# Patient Record
Sex: MALE | Race: WHITE | Employment: OTHER | URBAN - METROPOLITAN AREA
[De-identification: names, ages, dates, MRNs, and addresses within clinical notes are randomized per-mention and may not be internally consistent; named-entity substitution may affect disease eponyms.]

---

## 2017-12-26 ENCOUNTER — TRANSFERRED RECORDS (OUTPATIENT)
Dept: HEALTH INFORMATION MANAGEMENT | Facility: CLINIC | Age: 82
End: 2017-12-26

## 2017-12-26 LAB — EJECTION FRACTION: 58

## 2018-06-28 LAB
CREAT SERPL-MCNC: 0.85 MG/DL (ref 0.67–1.17)
GFR SERPL CREATININE-BSD FRML MDRD: >60 ML/MIN/1.73M2 (ref 60–150)
GLUCOSE SERPL-MCNC: 128 MG/DL (ref 74–100)
POTASSIUM SERPL-SCNC: 3.8 MMOL/L (ref 3.5–5.1)

## 2018-07-02 ENCOUNTER — TRANSFERRED RECORDS (OUTPATIENT)
Dept: HEALTH INFORMATION MANAGEMENT | Facility: CLINIC | Age: 83
End: 2018-07-02

## 2018-07-09 ENCOUNTER — ANESTHESIA (OUTPATIENT)
Dept: GASTROENTEROLOGY | Facility: CLINIC | Age: 83
End: 2018-07-09
Payer: MEDICARE

## 2018-07-09 ENCOUNTER — ANESTHESIA EVENT (OUTPATIENT)
Dept: GASTROENTEROLOGY | Facility: CLINIC | Age: 83
End: 2018-07-09
Payer: MEDICARE

## 2018-07-09 ENCOUNTER — HOSPITAL ENCOUNTER (OUTPATIENT)
Facility: CLINIC | Age: 83
Discharge: HOME | End: 2018-07-09
Attending: INTERNAL MEDICINE | Admitting: INTERNAL MEDICINE
Payer: MEDICARE

## 2018-07-09 ENCOUNTER — SURGERY (OUTPATIENT)
Age: 83
End: 2018-07-09

## 2018-07-09 VITALS
HEIGHT: 72 IN | RESPIRATION RATE: 29 BRPM | BODY MASS INDEX: 25.06 KG/M2 | DIASTOLIC BLOOD PRESSURE: 72 MMHG | SYSTOLIC BLOOD PRESSURE: 122 MMHG | OXYGEN SATURATION: 94 % | WEIGHT: 185 LBS

## 2018-07-09 LAB — COLONOSCOPY: NORMAL

## 2018-07-09 PROCEDURE — 45378 DIAGNOSTIC COLONOSCOPY: CPT | Performed by: INTERNAL MEDICINE

## 2018-07-09 PROCEDURE — 25000125 ZZHC RX 250: Performed by: NURSE ANESTHETIST, CERTIFIED REGISTERED

## 2018-07-09 PROCEDURE — 37000008 ZZH ANESTHESIA TECHNICAL FEE, 1ST 30 MIN: Performed by: INTERNAL MEDICINE

## 2018-07-09 PROCEDURE — 25000128 H RX IP 250 OP 636: Performed by: NURSE ANESTHETIST, CERTIFIED REGISTERED

## 2018-07-09 PROCEDURE — G0105 COLORECTAL SCRN; HI RISK IND: HCPCS | Performed by: INTERNAL MEDICINE

## 2018-07-09 PROCEDURE — 40000010 ZZH STATISTIC ANES STAT CODE-CRNA PER MINUTE: Performed by: INTERNAL MEDICINE

## 2018-07-09 RX ORDER — SODIUM CHLORIDE, SODIUM LACTATE, POTASSIUM CHLORIDE, CALCIUM CHLORIDE 600; 310; 30; 20 MG/100ML; MG/100ML; MG/100ML; MG/100ML
INJECTION, SOLUTION INTRAVENOUS CONTINUOUS PRN
Status: DISCONTINUED | OUTPATIENT
Start: 2018-07-09 | End: 2018-07-09

## 2018-07-09 RX ORDER — LIDOCAINE 40 MG/G
CREAM TOPICAL
Status: DISCONTINUED | OUTPATIENT
Start: 2018-07-09 | End: 2018-07-09 | Stop reason: HOSPADM

## 2018-07-09 RX ORDER — PROPOFOL 10 MG/ML
INJECTION, EMULSION INTRAVENOUS CONTINUOUS PRN
Status: DISCONTINUED | OUTPATIENT
Start: 2018-07-09 | End: 2018-07-09

## 2018-07-09 RX ORDER — PROPOFOL 10 MG/ML
INJECTION, EMULSION INTRAVENOUS PRN
Status: DISCONTINUED | OUTPATIENT
Start: 2018-07-09 | End: 2018-07-09

## 2018-07-09 RX ORDER — ONDANSETRON 2 MG/ML
4 INJECTION INTRAMUSCULAR; INTRAVENOUS
Status: DISCONTINUED | OUTPATIENT
Start: 2018-07-09 | End: 2018-07-09 | Stop reason: HOSPADM

## 2018-07-09 RX ADMIN — DEXMEDETOMIDINE HYDROCHLORIDE 4 MCG: 100 INJECTION, SOLUTION INTRAVENOUS at 12:31

## 2018-07-09 RX ADMIN — DEXMEDETOMIDINE HYDROCHLORIDE 8 MCG: 100 INJECTION, SOLUTION INTRAVENOUS at 12:27

## 2018-07-09 RX ADMIN — PROPOFOL 40 MG: 10 INJECTION, EMULSION INTRAVENOUS at 12:29

## 2018-07-09 RX ADMIN — DEXMEDETOMIDINE HYDROCHLORIDE 8 MCG: 100 INJECTION, SOLUTION INTRAVENOUS at 12:29

## 2018-07-09 RX ADMIN — PROPOFOL 150 MCG/KG/MIN: 10 INJECTION, EMULSION INTRAVENOUS at 12:28

## 2018-07-09 RX ADMIN — SODIUM CHLORIDE, POTASSIUM CHLORIDE, SODIUM LACTATE AND CALCIUM CHLORIDE: 600; 310; 30; 20 INJECTION, SOLUTION INTRAVENOUS at 12:27

## 2018-07-09 ASSESSMENT — LIFESTYLE VARIABLES: TOBACCO_USE: 1

## 2018-07-09 ASSESSMENT — ENCOUNTER SYMPTOMS
DYSRHYTHMIAS: 0
SEIZURES: 0

## 2018-07-09 NOTE — ANESTHESIA CARE TRANSFER NOTE
Patient: Ye Johns    Procedure(s):  COLONOSCOPY (MAC)  - Wound Class: II-Clean Contaminated    Diagnosis: HISTORY OF COLON POLYPS/RECALL COLONOSCOPY  Diagnosis Additional Information: No value filed.    Anesthesia Type:   MAC     Note:  Airway :Nasal Cannula  Patient transferred to:PACU  Comments: Pt to endo recovery. VSS. Report complete to RN.Handoff Report: Identifed the Patient, Identified the Reponsible Provider, Reviewed the pertinent medical history, Discussed the surgical course, Reviewed Intra-OP anesthesia mangement and issues during anesthesia, Set expectations for post-procedure period and Allowed opportunity for questions and acknowledgement of understanding      Vitals: (Last set prior to Anesthesia Care Transfer)    CRNA VITALS  7/9/2018 1220 - 7/9/2018 1254      7/9/2018             Pulse: 60    SpO2: 94 %    Resp Rate (set): 10                Electronically Signed By: Lacie Roman CRNA, APRN CRNA  July 9, 2018  12:54 PM

## 2018-07-09 NOTE — CONSULTS
Pre-Endoscopy History and Physical     Ye Johns MRN# [id number]   YOB: 1935 Age: 83 year old     Date of Procedure: 7/9/2018  Primary care provider: José Sands  Type of Endoscopy: colonoscopy  Reason for Procedure: previous polyps  Type of Anesthesia Anticipated: MAC    HPI:    Ye is a 83 year old male who will be undergoing the above procedure.      A history and physical has been performed. The patient's medications and allergies have been reviewed. The risks and benefits of the procedure and the sedation options and risks were discussed with the patient.  All questions were answered and informed consent was obtained.      No Known Allergies     No current facility-administered medications for this encounter.        There is no problem list on file for this patient.       Past Medical History:   Diagnosis Date     Hypertension         Past Surgical History:   Procedure Laterality Date     ENT SURGERY      tonsils       Social History   Substance Use Topics     Smoking status: Former Smoker     Start date: 7/9/1985     Smokeless tobacco: Never Used     Alcohol use Yes      Comment: pint of beer/day       No family history on file.      PHYSICAL EXAM:   /82  Resp 16  Ht 1.829 m (6')  Wt 83.9 kg (185 lb)  SpO2 98%  BMI 25.09 kg/m2 Estimated body mass index is 25.09 kg/(m^2) as calculated from the following:    Height as of this encounter: 1.829 m (6').    Weight as of this encounter: 83.9 kg (185 lb).   RESP: lungs clear to auscultation - no rales, rhonchi or wheezes  CV: regular rates and rhythm    IMPRESSION   ASA Class 2 - Mild systemic disease      Signed Electronically by: Ekta Crowell MD  July 9, 2018    .

## 2018-07-09 NOTE — ANESTHESIA POSTPROCEDURE EVALUATION
Patient: Ye Johns    Procedure(s):  COLONOSCOPY (MAC)  - Wound Class: II-Clean Contaminated    Diagnosis:HISTORY OF COLON POLYPS/RECALL COLONOSCOPY  Diagnosis Additional Information: No value filed.    Anesthesia Type:  MAC    Note:  Anesthesia Post Evaluation    Patient location during evaluation: Bedside  Patient participation: Able to fully participate in evaluation  Level of consciousness: awake and alert  Pain management: adequate  Airway patency: patent  Cardiovascular status: acceptable  Respiratory status: acceptable  Hydration status: acceptable  PONV: none             Last vitals:  Vitals:    07/09/18 1310 07/09/18 1320 07/09/18 1330   BP: 120/73 118/76 122/72   Resp: 16 14 29   SpO2: 98% 95% 94%         Electronically Signed By: Willian Boggs MD  July 9, 2018  5:07 PM

## 2018-07-09 NOTE — ANESTHESIA PREPROCEDURE EVALUATION
Anesthesia Evaluation     . Pt has had prior anesthetic.     No history of anesthetic complications          ROS/MED HX    ENT/Pulmonary:     (+)tobacco use, Past use , . .   (-) sleep apnea   Neurologic:      (-) seizures, CVA and TIA   Cardiovascular: Comment: Positive exercise stress TTE in 12/2017, with cardiac workup including CT coronary angiogram.  Seen by Dr. Valadez, cardiologist, who felt that patient could be treated medically and was appropriate candidate for MAC anesthetic for GI interventional procedures.    (+) Dyslipidemia, hypertension--CAD, --. : . . . :. . Previous cardiac testing      (-) CHF and arrhythmias   METS/Exercise Tolerance:  >4 METS   Hematologic:         Musculoskeletal:   (+) arthritis, , , -       GI/Hepatic:        (-) GERD and liver disease   Renal/Genitourinary:      (-) renal disease   Endo:      (-) Type II DM   Psychiatric:         Infectious Disease:         Malignancy:         Other:                     Physical Exam  Normal systems: dental    Airway   Mallampati: III  TM distance: >3 FB  Neck ROM: full  Comment: Small mouth opening    Dental     Cardiovascular   Rhythm and rate: regular      Pulmonary    breath sounds clear to auscultation                    Anesthesia Plan      History & Physical Review  History and physical reviewed and following examination; no interval change.    ASA Status:  3 .    NPO Status:  > 8 hours    Plan for MAC Reason for MAC:  Difficulty with conscious sedation (QS)  PONV prophylaxis:  Ondansetron (or other 5HT-3)  Propofol/precedex combination, favor precedex with efforts to minimize propofol as able      Postoperative Care  Postoperative pain management:  Multi-modal analgesia.      Consents  Anesthetic plan, risks, benefits and alternatives discussed with:  Patient..        Procedure: Procedure(s):  COLONOSCOPY  Preop diagnosis: HISTORY OF COLON POLYPS/RECALL COLONOSCOPY    No Known Allergies  Past Medical History:   Diagnosis Date      Hypertension      Past Surgical History:   Procedure Laterality Date     ENT SURGERY      tonsils     Social History   Substance Use Topics     Smoking status: Former Smoker     Start date: 7/9/1985     Smokeless tobacco: Never Used     Alcohol use Yes      Comment: pint of beer/day     Prior to Admission medications    Medication Sig Start Date End Date Taking? Authorizing Provider   ASPIRIN ADULT LOW STRENGTH PO    Yes Reported, Patient   Atorvastatin Calcium (LIPITOR PO)    Yes Reported, Patient   HYDROCHLOROTHIAZIDE PO    Yes Reported, Patient   Isosorbide Mononitrate (IMDUR PO)    Yes Reported, Patient     No current Epic-ordered facility-administered medications on file.      No current Epic-ordered outpatient prescriptions on file.       Wt Readings from Last 1 Encounters:   07/09/18 83.9 kg (185 lb)     Temp Readings from Last 1 Encounters:   No data found for Temp     BP Readings from Last 6 Encounters:   07/09/18 152/82     Pulse Readings from Last 4 Encounters:   No data found for Pulse     Resp Readings from Last 1 Encounters:   07/09/18 16     SpO2 Readings from Last 1 Encounters:   07/09/18 98%     ECHO: Exercise stress TTE 12/2017 - EF 55-60%, inducible wall motion abnormalities inferiorly/inferolaterally, no ECG changes or symptoms

## 2021-06-23 ENCOUNTER — TRANSFERRED RECORDS (OUTPATIENT)
Dept: HEALTH INFORMATION MANAGEMENT | Facility: CLINIC | Age: 86
End: 2021-06-23

## 2021-07-30 ENCOUNTER — TRANSFERRED RECORDS (OUTPATIENT)
Dept: HEALTH INFORMATION MANAGEMENT | Facility: CLINIC | Age: 86
End: 2021-07-30

## 2021-08-05 ENCOUNTER — MEDICAL CORRESPONDENCE (OUTPATIENT)
Dept: HEALTH INFORMATION MANAGEMENT | Facility: CLINIC | Age: 86
End: 2021-08-05

## 2021-08-06 ENCOUNTER — TRANSCRIBE ORDERS (OUTPATIENT)
Dept: OTHER | Age: 86
End: 2021-08-06

## 2021-08-06 DIAGNOSIS — K31.89 DUODENAL MASS: Primary | ICD-10-CM

## 2021-08-09 ENCOUNTER — TELEPHONE (OUTPATIENT)
Dept: GASTROENTEROLOGY | Facility: CLINIC | Age: 86
End: 2021-08-09

## 2021-08-09 NOTE — TELEPHONE ENCOUNTER
Advanced Endoscopy     Referring provider: Haylie Donnelly at [facility] to .  Phone: [phone]    Referred to: [facility]     Provider Requested: Dr. Cadet for EUS and biopsy     Referral Received: 8/6/21     Records received: [facility]    PET 8/3/21  IMPRESSION:   FDG avid mass in the proximal duodenum highly suspicious for malignancy. No evidence of FDG avid malignancy or metastasis elsewhere.    EGD 6/25/21  Impression:            - Z-line regular, 40 cm from the  incisors.   - A nasogastric tube was found in the    esophagus and stomach.    - Mild gastropathy.       - Severe duodenitis with acquired    duodenal stenosis as described above.    Biopsied.    - His duodenal findings do not have    the endoscopic findings of malignancy     though multiple repeated biopsies were obtained to rule out malignancy    FINAL DIAGNOSIS     Stomach, duodenal wall, endoscopic ultrasound-guided fine-needle aspiration ([id number]; smears and cell block; 6/23/2021): High- grade epithelial dysplasia. (See comment)     Duodenum, biopsy ([id number]; 6/23/2021): Duodenal mucosa with focal serrated change and focal high-grade dysplasia. (See comment)     Duodenum, stenosis, biopsy ([id number]; 6/25/2021): Duodenal mucosa with focal serrated change and focal high-grade dysplasia. Atypical cells are present in lymphovascular space consistent with lymphovascular invasion. (See comment)  COMMENT  Thank you for allowing me to review these duodenal biopsies and FNA from this 86-year-old man. I agree with your diagnosis of dysplastic change in the duodenal mucosa with focal areas demonstrating high-grade dysplasia. Serrated change is also noted. In the duodenal biopsy of the stenosis there appears to be clusters of atypical cells in lymphovascular space underlying the dysplastic epithelium. I would consider this fining indicating an underlying adenocarcinoma which could be arising from the  pancreatobiliary tract or duodenum.  Endoscopic and radiological correlation is necessary.    Upper EUS 21    Impression:            EGD- No gross lesions in esophagus.   Some mild NG trauma. NG removed from   scope    - A small amount of food (residue) in   the stomach.   - Duodenal deformity, dilated bulb   and stricture at the apex/second   portion, 4cm in length. Traversed and   biopsied only with the  scope   - Retained food in the duodenum.   Removal was successful.   EUS - exam focused on tissue   acquisiton    - A mass was found in the apex of the   duodenal bulb and in the second   portion of the duodenum, unable to    traverse but can visualize proximal   end of tumor. It appears to invade   uncinate process. This area was   aspirated. .    -5mm periduodenal lymph node,   abnormal but too small to FNA. No   celiac LN   - Pancreatic parenchymal   abnormalities consisting of hyperechoic strands, hyperechoic foci     and hypoechoic foci were noted in the   pancreatic body, pancreatic tail and   uncinate process of the pancreas.   - Multiple stones were visualized   endosonographically in the gallbladder. Limited view of head of pancreas as gallbladder next to the bulb, with shadowing from stones   - No focal lesions seen in the examined portions of the left lobe of the liver and left adrenal gland.   -Benign appearing subcarinal LN.    CT 21  IMPRESSION:     1. There is an approximately 5.0 cm length segment of circumferential wall thickening in the duodenum that is most concerning for duodenal adenocarcinoma. Focal soft tissue thickening along the superior portion of the third portion of the duodenum abutting the pancreas head is suspicious for invasion of tumor beyond the wall. Findings much less likely represent inflammatory bowel disease or pancreatic neoplasm. Recommend continued GI consultation.     Images received: in PACs    Evaluation for: duodenal mass     Clinical History (per RN  review):   1. Hospital discharge follow-up  Hospitalized on 06/17 and discharged on 07/07, I reviewed the discharge summary with the patient present today. Patient was admitted with a one-week progressively worsening abdominal discomfort. Was found to have a small-bowel obstruction caused by a duodenal mass. Eventually this was diagnosed as adenocarcinoma. He underwent a palliative Alejandra-en-Y gastrojejunostomy on 07/01. He did have anemia, hemoglobin on discharge was 7.3. Iron studies indicate chronic disease however his hemoglobin was normal on admission. Who he is gradually feeling better. Will recheck lab work today including hemoglobin. He has follow-up appointments with Oncology and surgery. He is on Lovenox for a total of 1 month post hospitalization for DVT prophylaxis given his surgery and likely hypercoagulable given malignancy. He otherwise seems to be doing well.    2. SBO (small bowel obstruction) (HRC)  As above, secondary to adenocarcinoma of the duodenum.    3. Adenocarcinoma of duodenum (HRC)  As above. Palliative Alejandra-en-Y gastrojejunostomy had been performed. He is recovering well from that surgery it seems. Pathology was reviewed done at the [facility]. He will be seeing Oncology in the near future for future plans of treatment.      MD review date:   MD Decision for clinic consultation/Orders:            Referral updates/Patient contacted:

## 2021-08-10 ENCOUNTER — PATIENT OUTREACH (OUTPATIENT)
Dept: GASTROENTEROLOGY | Facility: CLINIC | Age: 86
End: 2021-08-10

## 2021-08-10 ENCOUNTER — PREP FOR PROCEDURE (OUTPATIENT)
Dept: GASTROENTEROLOGY | Facility: CLINIC | Age: 86
End: 2021-08-10

## 2021-08-10 DIAGNOSIS — K31.89 DUODENAL MASS: Primary | ICD-10-CM

## 2021-08-10 DIAGNOSIS — Z20.822 ENCOUNTER FOR LABORATORY TESTING FOR COVID-19 VIRUS: Primary | ICD-10-CM

## 2021-08-15 NOTE — BRIEF OP NOTE
[facility] Brief Operative Note    Pre-operative diagnosis: Duodenal mass [K31.89]   Post-operative diagnosis As prior   Procedure: Procedure(s):  ENDOSCOPIC ULTRASOUND, ESOPHAGOSCOPY / UPPER GASTROINTESTINAL TRACT (GI) possible biopsy   Surgeon(s): Surgeon(s) and Role:     * Manjit Cadet MD - Primary   Estimated blood loss: * No values recorded between  and 8/14/2021  7:19 PM *    Specimens: * No specimens in log *   Findings:        ** In this 86 year-old male who recently underwent a palliative Alejandra-en-Y gastrojejunostomy on 07/01/21 for a proximal duodenal obstruction from a suspected malignant duodenal mass resulting in duodenal bulb/sweep deformity and stenosis, upper endoscopic evaluation revealed a Alejandra-en-Y gastrojejunostomy with healthy appearing mucosa and a severe duodenal stenosis in the bulb/sweep area.   ** Echoendoscopic examination showed a 18.2 mm by 10.2 mm proximal duodenal mass. Unclear if the lesion is a primary duodenal or pancreatic in origin. This is consistent with findings noted on PET CT scan from 8/2/21. Tissue was obtained and results are pending.  However, the endosonographic appearance is consistent with adenocarcinoma.  Fine needle biopsy performed.   - Pancreatic parenchymal abnormalities consisting of hyperechoic strands and lobularity were noted in the entire pancreas. The main pancreatic duct was not dilated.   - Multiple simple cystic lesions were found in the visualized portion of the liver. No other lesions were seen.   - Gallstones with no intra- or extrahepatic biliary ductal dilation.   - Splenic calcifications.   - No lymphadenopathy was noted in upper abdomen or examined mediastinum.  - Normal left adrenal gland.     - Observe patient in PACU for possible discharge same day.   - Dr. Cadet will communicate pathology results to the patient when available.   - The findings and recommendations were discussed with the patient and their family.     Keena  MD Keenan  Advanced GI Fellow  #[id number]

## 2021-08-17 ENCOUNTER — TRANSFERRED RECORDS (OUTPATIENT)
Dept: HEALTH INFORMATION MANAGEMENT | Facility: CLINIC | Age: 86
End: 2021-08-17

## 2021-08-19 ENCOUNTER — HOSPITAL ENCOUNTER (OUTPATIENT)
Facility: CLINIC | Age: 86
Discharge: HOME | End: 2021-08-19
Attending: INTERNAL MEDICINE | Admitting: INTERNAL MEDICINE
Payer: MEDICARE

## 2021-08-19 ENCOUNTER — ANESTHESIA EVENT (OUTPATIENT)
Dept: SURGERY | Facility: CLINIC | Age: 86
End: 2021-08-19
Payer: MEDICARE

## 2021-08-19 ENCOUNTER — ANESTHESIA (OUTPATIENT)
Dept: SURGERY | Facility: CLINIC | Age: 86
End: 2021-08-19
Payer: MEDICARE

## 2021-08-19 VITALS
OXYGEN SATURATION: 97 % | SYSTOLIC BLOOD PRESSURE: 115 MMHG | DIASTOLIC BLOOD PRESSURE: 77 MMHG | TEMPERATURE: 97.9 F | HEART RATE: 59 BPM | RESPIRATION RATE: 16 BRPM

## 2021-08-19 DIAGNOSIS — K31.89 DUODENAL MASS: ICD-10-CM

## 2021-08-19 LAB
GLUCOSE BLDC GLUCOMTR-MCNC: 107 MG/DL (ref 70–99)
UPPER EUS: NORMAL

## 2021-08-19 PROCEDURE — 250N000009 HC RX 250: Performed by: INTERNAL MEDICINE

## 2021-08-19 PROCEDURE — 250N000025 HC SEVOFLURANE, PER MIN: Performed by: INTERNAL MEDICINE

## 2021-08-19 PROCEDURE — 250N000009 HC RX 250: Performed by: NURSE ANESTHETIST, CERTIFIED REGISTERED

## 2021-08-19 PROCEDURE — 999N000141 HC STATISTIC PRE-PROCEDURE NURSING ASSESSMENT: Performed by: INTERNAL MEDICINE

## 2021-08-19 PROCEDURE — 272N000001 HC OR GENERAL SUPPLY STERILE: Performed by: INTERNAL MEDICINE

## 2021-08-19 PROCEDURE — 250N000011 HC RX IP 250 OP 636: Performed by: NURSE ANESTHETIST, CERTIFIED REGISTERED

## 2021-08-19 PROCEDURE — 258N000003 HC RX IP 258 OP 636: Performed by: NURSE ANESTHETIST, CERTIFIED REGISTERED

## 2021-08-19 PROCEDURE — 710N000012 HC RECOVERY PHASE 2, PER MINUTE: Performed by: INTERNAL MEDICINE

## 2021-08-19 PROCEDURE — 710N000010 HC RECOVERY PHASE 1, LEVEL 2, PER MIN: Performed by: INTERNAL MEDICINE

## 2021-08-19 PROCEDURE — 370N000017 HC ANESTHESIA TECHNICAL FEE, PER MIN: Performed by: INTERNAL MEDICINE

## 2021-08-19 PROCEDURE — 360N000076 HC SURGERY LEVEL 3, PER MIN: Performed by: INTERNAL MEDICINE

## 2021-08-19 PROCEDURE — 88342 IMHCHEM/IMCYTCHM 1ST ANTB: CPT | Mod: TC | Performed by: INTERNAL MEDICINE

## 2021-08-19 PROCEDURE — 88305 TISSUE EXAM BY PATHOLOGIST: CPT | Mod: TC | Performed by: INTERNAL MEDICINE

## 2021-08-19 RX ORDER — SODIUM CHLORIDE, SODIUM LACTATE, POTASSIUM CHLORIDE, CALCIUM CHLORIDE 600; 310; 30; 20 MG/100ML; MG/100ML; MG/100ML; MG/100ML
INJECTION, SOLUTION INTRAVENOUS CONTINUOUS PRN
Status: DISCONTINUED | OUTPATIENT
Start: 2021-08-19 | End: 2021-08-19

## 2021-08-19 RX ORDER — EPHEDRINE SULFATE 50 MG/ML
INJECTION, SOLUTION INTRAMUSCULAR; INTRAVENOUS; SUBCUTANEOUS PRN
Status: DISCONTINUED | OUTPATIENT
Start: 2021-08-19 | End: 2021-08-19

## 2021-08-19 RX ORDER — MEPERIDINE HYDROCHLORIDE 25 MG/ML
12.5 INJECTION INTRAMUSCULAR; INTRAVENOUS; SUBCUTANEOUS
Status: DISCONTINUED | OUTPATIENT
Start: 2021-08-19 | End: 2021-08-19 | Stop reason: HOSPADM

## 2021-08-19 RX ORDER — PROPOFOL 10 MG/ML
INJECTION, EMULSION INTRAVENOUS PRN
Status: DISCONTINUED | OUTPATIENT
Start: 2021-08-19 | End: 2021-08-19

## 2021-08-19 RX ORDER — FLUMAZENIL 0.1 MG/ML
0.2 INJECTION, SOLUTION INTRAVENOUS
Status: DISCONTINUED | OUTPATIENT
Start: 2021-08-19 | End: 2021-08-19 | Stop reason: HOSPADM

## 2021-08-19 RX ORDER — LIDOCAINE HYDROCHLORIDE 20 MG/ML
INJECTION, SOLUTION INFILTRATION; PERINEURAL PRN
Status: DISCONTINUED | OUTPATIENT
Start: 2021-08-19 | End: 2021-08-19

## 2021-08-19 RX ORDER — LABETALOL HYDROCHLORIDE 5 MG/ML
10 INJECTION, SOLUTION INTRAVENOUS
Status: DISCONTINUED | OUTPATIENT
Start: 2021-08-19 | End: 2021-08-19 | Stop reason: HOSPADM

## 2021-08-19 RX ORDER — NALOXONE HYDROCHLORIDE 0.4 MG/ML
0.2 INJECTION, SOLUTION INTRAMUSCULAR; INTRAVENOUS; SUBCUTANEOUS
Status: DISCONTINUED | OUTPATIENT
Start: 2021-08-19 | End: 2021-08-19 | Stop reason: HOSPADM

## 2021-08-19 RX ORDER — LIDOCAINE 40 MG/G
CREAM TOPICAL
Status: DISCONTINUED | OUTPATIENT
Start: 2021-08-19 | End: 2021-08-19 | Stop reason: HOSPADM

## 2021-08-19 RX ORDER — ONDANSETRON 4 MG/1
4 TABLET, ORALLY DISINTEGRATING ORAL EVERY 30 MIN PRN
Status: DISCONTINUED | OUTPATIENT
Start: 2021-08-19 | End: 2021-08-19 | Stop reason: HOSPADM

## 2021-08-19 RX ORDER — OXYCODONE HYDROCHLORIDE 5 MG/1
5 TABLET ORAL EVERY 4 HOURS PRN
Status: DISCONTINUED | OUTPATIENT
Start: 2021-08-19 | End: 2021-08-19 | Stop reason: HOSPADM

## 2021-08-19 RX ORDER — HYDROMORPHONE HCL IN WATER/PF 6 MG/30 ML
0.2 PATIENT CONTROLLED ANALGESIA SYRINGE INTRAVENOUS EVERY 5 MIN PRN
Status: DISCONTINUED | OUTPATIENT
Start: 2021-08-19 | End: 2021-08-19 | Stop reason: HOSPADM

## 2021-08-19 RX ORDER — FENTANYL CITRATE 50 UG/ML
INJECTION, SOLUTION INTRAMUSCULAR; INTRAVENOUS PRN
Status: DISCONTINUED | OUTPATIENT
Start: 2021-08-19 | End: 2021-08-19

## 2021-08-19 RX ORDER — ONDANSETRON 2 MG/ML
4 INJECTION INTRAMUSCULAR; INTRAVENOUS EVERY 30 MIN PRN
Status: DISCONTINUED | OUTPATIENT
Start: 2021-08-19 | End: 2021-08-19 | Stop reason: HOSPADM

## 2021-08-19 RX ORDER — FENTANYL CITRATE 50 UG/ML
25 INJECTION, SOLUTION INTRAMUSCULAR; INTRAVENOUS
Status: DISCONTINUED | OUTPATIENT
Start: 2021-08-19 | End: 2021-08-19 | Stop reason: HOSPADM

## 2021-08-19 RX ORDER — ONDANSETRON 2 MG/ML
4 INJECTION INTRAMUSCULAR; INTRAVENOUS EVERY 6 HOURS PRN
Status: DISCONTINUED | OUTPATIENT
Start: 2021-08-19 | End: 2021-08-19 | Stop reason: HOSPADM

## 2021-08-19 RX ORDER — ONDANSETRON 2 MG/ML
INJECTION INTRAMUSCULAR; INTRAVENOUS PRN
Status: DISCONTINUED | OUTPATIENT
Start: 2021-08-19 | End: 2021-08-19

## 2021-08-19 RX ORDER — NALOXONE HYDROCHLORIDE 0.4 MG/ML
0.4 INJECTION, SOLUTION INTRAMUSCULAR; INTRAVENOUS; SUBCUTANEOUS
Status: DISCONTINUED | OUTPATIENT
Start: 2021-08-19 | End: 2021-08-19 | Stop reason: HOSPADM

## 2021-08-19 RX ORDER — FENTANYL CITRATE 50 UG/ML
25 INJECTION, SOLUTION INTRAMUSCULAR; INTRAVENOUS EVERY 5 MIN PRN
Status: DISCONTINUED | OUTPATIENT
Start: 2021-08-19 | End: 2021-08-19 | Stop reason: HOSPADM

## 2021-08-19 RX ORDER — SODIUM CHLORIDE, SODIUM LACTATE, POTASSIUM CHLORIDE, CALCIUM CHLORIDE 600; 310; 30; 20 MG/100ML; MG/100ML; MG/100ML; MG/100ML
INJECTION, SOLUTION INTRAVENOUS CONTINUOUS
Status: DISCONTINUED | OUTPATIENT
Start: 2021-08-19 | End: 2021-08-19 | Stop reason: HOSPADM

## 2021-08-19 RX ORDER — ALBUTEROL SULFATE 0.83 MG/ML
2.5 SOLUTION RESPIRATORY (INHALATION) EVERY 4 HOURS PRN
Status: DISCONTINUED | OUTPATIENT
Start: 2021-08-19 | End: 2021-08-19 | Stop reason: HOSPADM

## 2021-08-19 RX ORDER — ONDANSETRON 4 MG/1
4 TABLET, ORALLY DISINTEGRATING ORAL EVERY 6 HOURS PRN
Status: DISCONTINUED | OUTPATIENT
Start: 2021-08-19 | End: 2021-08-19 | Stop reason: HOSPADM

## 2021-08-19 RX ADMIN — Medication 10 MG: at 08:06

## 2021-08-19 RX ADMIN — ONDANSETRON 4 MG: 2 INJECTION INTRAMUSCULAR; INTRAVENOUS at 09:07

## 2021-08-19 RX ADMIN — SUGAMMADEX 200 MG: 100 INJECTION, SOLUTION INTRAVENOUS at 09:06

## 2021-08-19 RX ADMIN — SODIUM CHLORIDE, POTASSIUM CHLORIDE, SODIUM LACTATE AND CALCIUM CHLORIDE: 600; 310; 30; 20 INJECTION, SOLUTION INTRAVENOUS at 07:48

## 2021-08-19 RX ADMIN — ROCURONIUM BROMIDE 50 MG: 10 INJECTION INTRAVENOUS at 07:56

## 2021-08-19 RX ADMIN — FENTANYL CITRATE 50 MCG: 50 INJECTION, SOLUTION INTRAMUSCULAR; INTRAVENOUS at 08:19

## 2021-08-19 RX ADMIN — ROCURONIUM BROMIDE 10 MG: 10 INJECTION INTRAVENOUS at 08:53

## 2021-08-19 RX ADMIN — PROPOFOL 100 MG: 10 INJECTION, EMULSION INTRAVENOUS at 07:56

## 2021-08-19 RX ADMIN — LIDOCAINE HYDROCHLORIDE 60 MG: 20 INJECTION, SOLUTION INFILTRATION; PERINEURAL at 07:58

## 2021-08-19 RX ADMIN — PROPOFOL 100 MG: 10 INJECTION, EMULSION INTRAVENOUS at 07:58

## 2021-08-19 RX ADMIN — FENTANYL CITRATE 50 MCG: 50 INJECTION, SOLUTION INTRAMUSCULAR; INTRAVENOUS at 07:56

## 2021-08-19 NOTE — OR NURSING
Dr Mclean verbalizes PACU signout, saw patient in PACU.  Dr Hussein bedside in PACU, reviewed procedure w patient and stated Dr Cadet will call Marcie and Eric with biopsy results in approx 7 days, Amalia phase II RN will clarify if Dr Cadet plans to see Eric prior to discharge    Patient's spouse, Marcie, is requesting a printed procedure note, I will call the endo RNs to assist in printing this (r/t their having a color printer).

## 2021-08-19 NOTE — ANESTHESIA PROCEDURE NOTES
Airway       Patient location during procedure: OR       Procedure Start/Stop Times: 8/19/2021 7:59 AM  Staff -        CRNA: Devyn Alvarado APRN CRNA       Performed By: CRNA  Consent for Airway        Urgency: elective  Indications and Patient Condition       Indications for airway management: angelina-procedural       Induction type:intravenous       Mask difficulty assessment: 1 - vent by mask    Final Airway Details       Final airway type: endotracheal airway       Successful airway: ETT - single  Endotracheal Airway Details        ETT size (mm): 7.5       Cuffed: yes       Successful intubation technique: direct laryngoscopy       DL Blade Type: MAC 4       Grade View of Cords: 3       Adjucts: stylet       Position: Right       Measured from: lips       Secured at (cm): 23    Post intubation assessment        Placement verified by: capnometry and equal breath sounds        Number of attempts at approach: 1       Secured with: pink tape       Ease of procedure: difficult       Dentition: Intact and Unchanged    Additional Comments       Very anterior front teeth very prominent

## 2021-08-19 NOTE — OR NURSING
Dr Cadet in to speak with pt and his wife post procedure.  Pt denies pain/nausea, up to void, clears and granola bar post op.

## 2021-08-19 NOTE — ANESTHESIA CARE TRANSFER NOTE
Patient: Ye Johns    Procedure(s):  ENDOSCOPIC ULTRASOUND WITH FINE NEEDLE BIOPSIES, ESOPHAGOGASTRODUODENOSCOPY    Diagnosis: Duodenal mass [K31.89]  Diagnosis Additional Information: No value filed.    Anesthesia Type:   No value filed.     Note:    Oropharynx: oropharynx clear of all foreign objects and spontaneously breathing  Level of Consciousness: awake  Oxygen Supplementation: face mask  Level of Supplemental Oxygen (L/min / FiO2): 6  Independent Airway: airway patency satisfactory and stable  Dentition: dentition unchanged  Vital Signs Stable: post-procedure vital signs reviewed and stable  Report to RN Given: handoff report given  Patient transferred to: PACU    Handoff Report: Identifed the Patient, Identified the Reponsible Provider, Reviewed the pertinent medical history, Discussed the surgical course, Reviewed Intra-OP anesthesia mangement and issues during anesthesia, Set expectations for post-procedure period and Allowed opportunity for questions and acknowledgement of understanding      Vitals:  Vitals Value Taken Time   BP     Temp     Pulse     Resp     SpO2         Electronically Signed By: ELOINA Lea CRNA  August 19, 2021  9:22 AM

## 2021-08-19 NOTE — ANESTHESIA PREPROCEDURE EVALUATION
Anesthesia Pre-Procedure Evaluation    Patient: Ye Johns   MRN: [id number] : 1935        Preoperative Diagnosis: Duodenal mass [K31.89]   Procedure : Procedure(s):  ENDOSCOPIC ULTRASOUND, ESOPHAGOSCOPY / UPPER GASTROINTESTINAL TRACT (GI) possible biopsy     Past Medical History:   Diagnosis Date     Coronary artery disease      Hypertension      Psoriasis      Psoriasis       Past Surgical History:   Procedure Laterality Date     COLONOSCOPY N/A 2018    Procedure: COLONOSCOPY;  COLONOSCOPY (MAC) ;  Surgeon: Ekta Crowell MD;  Location:  GI     ENT SURGERY      tonsils     ORTHOPEDIC SURGERY        No Known Allergies   Social History     Tobacco Use     Smoking status: Former Smoker     Types: Cigarettes     Start date: 1985     Smokeless tobacco: Never Used   Substance Use Topics     Alcohol use: Yes     Comment: 1 beer daily      Wt Readings from Last 1 Encounters:   18 83.9 kg (185 lb)        Anesthesia Evaluation   Pt has had prior anesthetic. Type: General and MAC (previously intubated with VL (easy x2 in )).    No history of anesthetic complications       ROS/MED HX  ENT/Pulmonary:       Neurologic:       Cardiovascular:     (+) hypertension-range: SBP 120s/ -CAD ---    METS/Exercise Tolerance:  Comment: HTN - no longer takes medications, normotensive.  CAD - by noninvasive testing, but not an active issue (no angina), and medical management recommended in 2018.  On statin.   Hematologic:       Musculoskeletal:       GI/Hepatic:       Renal/Genitourinary:       Endo:       Psychiatric/Substance Use:       Infectious Disease:       Malignancy: Comment: Concern for duodenal mass - non-diagnostic tissue obtained with previous biopsies early   (+) Malignancy,     Other:            Physical Exam    Airway      Comment: Mildly limited TM distance    Mallampati: I    Neck ROM: full   Mouth opening: > 3 cm    Respiratory Devices and Support         Dental  no notable dental  history         Cardiovascular          Rhythm and rate: regular and normal     Pulmonary           breath sounds clear to auscultation           OUTSIDE LABS:  CBC: No results found for: WBC, HGB, HCT, PLT  BMP:   Lab Results   Component Value Date    POTASSIUM 3.8 06/28/2018    CR 0.85 06/28/2018     (H) 08/19/2021     (H) 06/28/2018     COAGS: No results found for: PTT, INR, FIBR  POC: No results found for: BGM, HCG, HCGS  HEPATIC: No results found for: ALBUMIN, PROTTOTAL, ALT, AST, GGT, ALKPHOS, BILITOTAL, BILIDIRECT, NATE  OTHER: No results found for: PH, LACT, A1C, WILBER, PHOS, MAG, LIPASE, AMYLASE, TSH, T4, T3, CRP, SED    Anesthesia Plan    ASA Status:  2   NPO Status:  NPO Appropriate    Anesthesia Type: General.     - Airway: ETT   Induction: Intravenous, Propofol.   Maintenance: Balanced.   Techniques and Equipment:     Airway: backup VL available.         Consents    Anesthesia Plan(s) and associated risks, benefits, and realistic alternatives discussed. Questions answered and patient/representative(s) expressed understanding.     - Discussed with:  Patient      - Extended Intubation/Ventilatory Support Discussed: No.      - Patient is DNR/DNI Status: No    Use of blood products discussed: No .     Postoperative Care    Pain management: IV analgesics, Oral pain medications.   PONV prophylaxis: Ondansetron (or other 5HT-3), Dexamethasone or Solumedrol     Comments:    ______________________________________________________________________  Outside labs, ECG reviewed - no concerning findings.  I discussed the risks and benefits of general anesthesia with the patient.  Questions were sought and answered.      Anibal Mclean MD  Attending Anesthesiologist              Anibal Mclean MD

## 2021-08-19 NOTE — ANESTHESIA POSTPROCEDURE EVALUATION
Patient: Ye Johns    Procedure(s):  ENDOSCOPIC ULTRASOUND WITH FINE NEEDLE BIOPSIES, ESOPHAGOGASTRODUODENOSCOPY    Diagnosis:Duodenal mass [K31.89]  Diagnosis Additional Information: No value filed.    Anesthesia Type:  No value filed.    Note:  Disposition: Outpatient   Postop Pain Control: Uneventful            Sign Out: Well controlled pain   PONV: No   Neuro/Psych: Uneventful            Sign Out: Acceptable/Baseline neuro status   Airway/Respiratory: Uneventful            Sign Out: Acceptable/Baseline resp. status   CV/Hemodynamics: Uneventful            Sign Out: Acceptable CV status   Other NRE: NONE   DID A NON-ROUTINE EVENT OCCUR? No    Event details/Postop Comments:  No noted anesthetic complications.  Patient satisfied with anesthetic.             Last vitals:  Vitals Value Taken Time   /74 08/19/21 1000   Temp 36.6  C (97.8  F) 08/19/21 0945   Pulse 63 08/19/21 1000   Resp 16 08/19/21 1000   SpO2 97 % 08/19/21 1005   Vitals shown include unvalidated device data.    Electronically Signed By: Anibal Mclean MD  August 19, 2021  10:36 AM

## 2021-08-19 NOTE — DISCHARGE INSTRUCTIONS
[facility], [place] // Same-Day Surgery // Adult Discharge Orders & Instructions     Discharge Instructions after Endoscopic Ultrasound  Activity: You were given medicine for pain. You may be dizzy or sleepy.    For 24 hours:    Do not drive or use heavy equipment.    Do not make important decisions.    Do not drink any alcohol.    Diet: Wait one hour before eating or drinking. Start with sips of water. When your gag reflex has returned you may go back to your usual diet, medicines and light exercise.    Discomfort    Some bloating is normal. You may have large burps or pass air.    You may have a sore throat for 2 to 3 days. It may help to:    Avoid hot liquids for 24 hours.    Use sore throat lozenges.    Gargle as needed with salt water up to 4 times a day. Mix 1 cup of warm water with 1 teaspoon of salt. Do not swallow.    You may take Tylenol (acetaminophen) for pain unless your doctor has told you not to.    Do not take aspirin or ibuprofen (Advil, Motrin, or other anti-inflammatory drugs) for _____ days.    Follow-up: We took small tissue or fluid samples to study. We will call you with the results in about 10 working days.    When to call  Call right away if you have:    Severe throat pain or trouble swallowing    Black stools (tar-like looking bowel movement)    Fever above 100.6 F (37.5 C)    Unusual pain in belly or chest not relieved by belching or passing air.    If you vomit blood or have severe pain, go to an emergency room.    If you have questions, call  Monday to Friday, 8 a.m. to 4:30 p.m.: Central Scheduling Department: [phone]  After hours: Hospital: [phone] (Ask for the GI fellow on call)      Take it easy when you get home.  Remember, same day surgery DOES NOT MEAN SAME DAY RECOVERY! Healing is a gradual process.   You will need some time to recover- you may be more tired than you realize at first.  Rest and relax for at least the first 24 hours at home.   "You'll feel better and heal faster if you take good care of yourself.     ANESTHESIA RECOVERY -   For 24 hours after surgery    1. Get plenty of rest.  A responsible adult must stay with you for at least 24 hours after you leave the hospital.   2. Do not drive or use heavy equipment.  If you have weakness or tingling, don't drive or use heavy equipment until this feeling goes away.  3. Do not drink alcohol.  4. Avoid strenuous or risky activities.  Ask for help when climbing stairs.   5. You may feel lightheaded.  IF so, sit for a few minutes before standing.  Have someone help you get up.   6. If you have nausea (feel sick to your stomach): Drink only clear liquids such as apple juice, ginger ale, broth or 7-Up.  Rest may also help.  Be sure to drink enough fluids.  Move to a regular diet as you feel able.  7. You may have a slight fever. Call the doctor if your fever is over 100 F (37.7 C) (taken under the tongue) or lasts longer than 24 hours.  8. You may have a dry mouth, a sore throat, muscle aches or trouble sleeping.  These should go away after 24 hours.  9. Do not make important or legal decisions.     Call your doctor for any of the followin.  Signs of infection (fever, growing tenderness at the surgery site, a large amount of drainage or bleeding, severe pain, foul-smelling drainage, redness, swelling).  2. It has been over 8 to 10 hours since surgery and you are still not able to urinate (pass water).  3.  Headache for over 24 hours.    To contact a doctor, call:  Dr Cadet at the  GI clinic at [phone] and ask for the resident on call for Gastroenterology or \"GI\" (answered 24 hours a day)    Emergency Department: [facility]: [phone] (TTY for hearing impaired: [phone])    "

## 2021-08-20 PROCEDURE — 88173 CYTOPATH EVAL FNA REPORT: CPT | Mod: 26 | Performed by: PATHOLOGY

## 2021-08-20 PROCEDURE — 88172 CYTP DX EVAL FNA 1ST EA SITE: CPT | Mod: 26 | Performed by: PATHOLOGY

## 2021-08-20 PROCEDURE — 88305 TISSUE EXAM BY PATHOLOGIST: CPT | Mod: 26 | Performed by: PATHOLOGY

## 2021-08-24 ENCOUNTER — TELEPHONE (OUTPATIENT)
Dept: GASTROENTEROLOGY | Facility: CLINIC | Age: 86
End: 2021-08-24

## 2021-08-24 LAB
PATH REPORT.ADDENDUM SPEC: ABNORMAL
PATH REPORT.COMMENTS IMP SPEC: ABNORMAL
PATH REPORT.COMMENTS IMP SPEC: YES
PATH REPORT.FINAL DX SPEC: ABNORMAL
PATH REPORT.GROSS SPEC: ABNORMAL

## 2021-08-24 PROCEDURE — 88341 IMHCHEM/IMCYTCHM EA ADD ANTB: CPT | Mod: 26 | Performed by: PATHOLOGY

## 2021-08-24 PROCEDURE — 88342 IMHCHEM/IMCYTCHM 1ST ANTB: CPT | Mod: 26 | Performed by: PATHOLOGY

## 2021-08-24 NOTE — TELEPHONE ENCOUNTER
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 Called and informed pt.     He has appt with oncology (Dr. Donnelly) at [facility] this afternoon.    KAREN Cadet MD  Professor of Medicine  Division of Gastroenterology, Hepatology and Nutrition  [facility]

## (undated) DEVICE — PACK ENDOSCOPY GI CUSTOM UMMC

## (undated) DEVICE — ENDO PROBE COVER ULTRASOUND BALLOON LATEX  MAJ-249

## (undated) DEVICE — SUCTION MANIFOLD NEPTUNE 2 SYS 4 PORT 0702-020-000

## (undated) DEVICE — ENDO BITE BLOCK ADULT OMNI-BLOC

## (undated) DEVICE — SOL WATER IRRIG 1000ML BOTTLE 2F7114

## (undated) DEVICE — ENDO CAP AND TUBING STERILE FOR ENDOGATOR  100130

## (undated) DEVICE — KIT CONNECTOR FOR OLYMPUS ENDOSCOPES DEFENDO 100310

## (undated) DEVICE — KIT ENDO FIRST STEP DISINFECTANT 200ML W/POUCH EP-4

## (undated) DEVICE — ENDO TUBING CO2 SMARTCAP STERILE DISP 100145CO2EXT

## (undated) DEVICE — NDL BLUNT 15GA 1.5"

## (undated) DEVICE — ENDO NDL ASPIRATION ULTRASOUND 22GA ACQUIRE M00555540

## (undated) RX ORDER — FENTANYL CITRATE 50 UG/ML
INJECTION, SOLUTION INTRAMUSCULAR; INTRAVENOUS
Status: DISPENSED
Start: 2021-08-19

## (undated) RX ORDER — ONDANSETRON 2 MG/ML
INJECTION INTRAMUSCULAR; INTRAVENOUS
Status: DISPENSED
Start: 2021-08-19

## (undated) RX ORDER — IOPAMIDOL 510 MG/ML
INJECTION, SOLUTION INTRAVASCULAR
Status: DISPENSED
Start: 2021-08-19

## (undated) RX ORDER — LIDOCAINE HYDROCHLORIDE 20 MG/ML
INJECTION, SOLUTION EPIDURAL; INFILTRATION; INTRACAUDAL; PERINEURAL
Status: DISPENSED
Start: 2021-08-19

## (undated) RX ORDER — PROPOFOL 10 MG/ML
INJECTION, EMULSION INTRAVENOUS
Status: DISPENSED
Start: 2021-08-19

## (undated) RX ORDER — DEXAMETHASONE SODIUM PHOSPHATE 4 MG/ML
INJECTION, SOLUTION INTRA-ARTICULAR; INTRALESIONAL; INTRAMUSCULAR; INTRAVENOUS; SOFT TISSUE
Status: DISPENSED
Start: 2021-08-19

## (undated) RX ORDER — SIMETHICONE 40MG/0.6ML
SUSPENSION, DROPS(FINAL DOSAGE FORM)(ML) ORAL
Status: DISPENSED
Start: 2021-08-19